# Patient Record
(demographics unavailable — no encounter records)

---

## 2025-03-08 NOTE — DISCUSSION/SUMMARY
[EKG obtained to assist in diagnosis and management of assessed problem(s)] : EKG obtained to assist in diagnosis and management of assessed problem(s) [FreeTextEntry1] : 39 year man with a history as listed presents for an initial cardiac evaluation.  Sobeida appears to be hypertensive though he has been very anxious lately and noted that his BP is often normal at home. He will get a 24 hour ambulatory BP monitor to rule out white coat hypertension and to better assess her overall BP during the day.  HIs EKG shows ST with nonspecific ST changes. His tachycardia is possibly from anxiety. I have asked him to hydrate. He will get a 2d echo to assess for any  new structural heart disease, changes in valvular and ventricular function. He will undergo a treadmill exercise stress test to define exercise tolerance, rule out exertional hypertensive responses, assess for exercise induced arrhythmias and rule out ischemia from obstructive CAD.  He will get a Ca score for risk stratification.  Exercise and diet counseling was performed in order to reduce her future cardiovascular risk.  He will followup with me in 3 months  or sooner if necessary.

## 2025-03-08 NOTE — HISTORY OF PRESENT ILLNESS
[FreeTextEntry1] : 39 year old man with a history of hyper triglycerides, migraines presents for an initial cardiac evaluation.   He walks 2 miles on treadmill about 2 times a week for 30 minutes. He notes his heart racing when doing the stairs. He   denies any chest pain, PND, orthopnea, lower extremity edema, near syncope, syncope, stroke like symptoms. He denies any blurry vision, headaches. Medication reconciliation performed. He is compliant with his medications.